# Patient Record
Sex: MALE | Race: WHITE | Employment: UNEMPLOYED | RURAL
[De-identification: names, ages, dates, MRNs, and addresses within clinical notes are randomized per-mention and may not be internally consistent; named-entity substitution may affect disease eponyms.]

---
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
## 2021-01-29 ENCOUNTER — HOSPITAL ENCOUNTER (OUTPATIENT)
Dept: RESPIRATORY THERAPY | Facility: HOSPITAL | Age: 21
Discharge: HOME | End: 2021-01-29
Payer: MEDICAID

## 2021-01-29 ENCOUNTER — HOSPITAL ENCOUNTER (OUTPATIENT)
Facility: HOSPITAL | Age: 21
Discharge: HOME | End: 2021-01-29
Payer: MEDICAID

## 2021-01-29 DIAGNOSIS — B40.9 BLASTOMYCOSIS, UNSPECIFIED: ICD-10-CM

## 2021-01-29 DIAGNOSIS — R06.02 SHORTNESS OF BREATH: ICD-10-CM

## 2021-01-29 LAB — SARS-COV-2, NAAT: NOT DETECTED

## 2021-01-29 PROCEDURE — 94010 BREATHING CAPACITY TEST: CPT

## 2021-01-29 PROCEDURE — U0002 COVID-19 LAB TEST NON-CDC: HCPCS

## 2021-03-16 ENCOUNTER — OFFICE VISIT (OUTPATIENT)
Dept: PULMONOLOGY | Facility: CLINIC | Age: 21
End: 2021-03-16

## 2021-03-16 VITALS
DIASTOLIC BLOOD PRESSURE: 90 MMHG | SYSTOLIC BLOOD PRESSURE: 130 MMHG | HEART RATE: 102 BPM | TEMPERATURE: 97.1 F | WEIGHT: 246.4 LBS | OXYGEN SATURATION: 97 %

## 2021-03-16 DIAGNOSIS — J45.40 MODERATE PERSISTENT ASTHMA WITHOUT COMPLICATION: Primary | ICD-10-CM

## 2021-03-16 DIAGNOSIS — B40.9 BLASTOMYCOSIS: ICD-10-CM

## 2021-03-16 PROCEDURE — 99204 OFFICE O/P NEW MOD 45 MIN: CPT | Performed by: INTERNAL MEDICINE

## 2021-03-16 RX ORDER — ALBUTEROL SULFATE 90 UG/1
2 AEROSOL, METERED RESPIRATORY (INHALATION) EVERY 4 HOURS PRN
COMMUNITY
End: 2021-09-09 | Stop reason: SDUPTHER

## 2021-03-16 RX ORDER — MONTELUKAST SODIUM 10 MG/1
10 TABLET ORAL NIGHTLY
Qty: 90 TABLET | Refills: 3 | Status: SHIPPED | OUTPATIENT
Start: 2021-03-16 | End: 2021-09-09 | Stop reason: SDUPTHER

## 2021-03-16 RX ORDER — ALBUTEROL SULFATE 2.5 MG/3ML
SOLUTION RESPIRATORY (INHALATION)
COMMUNITY
Start: 2021-03-11 | End: 2021-09-09

## 2021-03-16 RX ORDER — FLUTICASONE PROPIONATE 50 MCG
2 SPRAY, SUSPENSION (ML) NASAL DAILY
Qty: 16 G | Refills: 11 | Status: SHIPPED | OUTPATIENT
Start: 2021-03-16 | End: 2023-03-23 | Stop reason: SDUPTHER

## 2021-03-16 RX ORDER — TIOTROPIUM BROMIDE 18 UG/1
CAPSULE ORAL; RESPIRATORY (INHALATION)
COMMUNITY
Start: 2021-02-24 | End: 2021-03-16

## 2021-03-16 NOTE — PROGRESS NOTES
New Patient Pulmonary Office Visit      Patient Name: Humberto Noguera    Referring Physician: Suzie Ross APRN    Chief Complaint:    Chief Complaint   Patient presents with   • Cough   • Shortness of Breath   • Wheezing       History of Present Illness: Humberto Noguera is a 21 y.o. male who is here today to establish care with Pulmonary.  Patient has a past medical history significant for blastomycosis.  He presents to pulmonary for evaluation of shortness of breath.  Patient was diagnosed with acute blastomycosis back in 2017.  This was treated at the [facility] with itraconazole.  He ultimately made improvements from a vascular standpoint but after that point he always has had shortness of breath and cough.  He notes that he coughs almost on a daily basis.  He is unaware has any allergies and denies any reflux.  Notes that he has nighttime cough and awakening coughing throughout the day.  He is always short of breath.  He notes that he wheezes on a daily basis.  He states that things that helped him in the past are mainly albuterol and Symbicort.  But the insurance would not pay for Symbicort so he stopped taking it.  He tried having Spiriva but this is providing him no benefit.  Currently he is using the albuterol over 6 times per day due to the fact that he feels that he is short of breath all the time.  He denies any fever, chills, nausea, or vomiting.    Review of Systems:   Review of Systems   Constitutional: Negative for activity change, appetite change, chills and diaphoresis.   HENT: Negative for congestion, postnasal drip, sinus pressure and voice change.    Eyes: Negative for blurred vision.   Respiratory: Positive for cough, chest tightness, shortness of breath and wheezing.    Cardiovascular: Negative for chest pain.   Gastrointestinal: Negative for abdominal pain.   Musculoskeletal: Negative for myalgias.   Skin: Negative for color change and dry skin.   Allergic/Immunologic: Negative for  environmental allergies.   Neurological: Negative for weakness and confusion.   Hematological: Negative for adenopathy.   Psychiatric/Behavioral: Negative for sleep disturbance and depressed mood.       Past Medical History: History reviewed. No pertinent past medical history.    Past Surgical History: History reviewed. No pertinent surgical history.    Family History: History reviewed. No pertinent family history.    Social History:   Social History     Socioeconomic History   • Marital status: Single     Spouse name: Not on file   • Number of children: Not on file   • Years of education: Not on file   • Highest education level: Not on file   Tobacco Use   • Smoking status: Never Smoker   • Smokeless tobacco: Never Used   Substance and Sexual Activity   • Alcohol use: Never   • Drug use: Never   • Sexual activity: Defer       Medications:     Current Outpatient Medications:   •  albuterol (PROVENTIL) (2.5 MG/3ML) 0.083% nebulizer solution, USE THREE MILLILITERS I NEBULIZER EVERY 4-6 HOURS FOR COUGH AND WHEEZING AS NEEDED, Disp: , Rfl:   •  albuterol sulfate  (90 Base) MCG/ACT inhaler, Inhale 2 puffs Every 4 (Four) Hours As Needed for Wheezing., Disp: , Rfl:   •  fluticasone (Flonase) 50 MCG/ACT nasal spray, 2 sprays into the nostril(s) as directed by provider Daily., Disp: 16 g, Rfl: 11  •  Fluticasone Furoate-Vilanterol (Breo Ellipta) 100-25 MCG/INH inhaler, Inhale 1 puff Daily., Disp: 60 each, Rfl: 5  •  montelukast (SINGULAIR) 10 MG tablet, Take 1 tablet by mouth Every Night., Disp: 90 tablet, Rfl: 3    Allergies:   No Known Allergies    Physical Exam:  Vital Signs:   Vitals:    03/16/21 1207   BP: 130/90   Pulse: 102   Temp: 97.1 °F (36.2 °C)   SpO2: 97%  Comment: resting, room air   Weight: 112 kg (246 lb 6.4 oz)       Physical Exam  Vitals and nursing note reviewed.   Constitutional:       General: He is not in acute distress.     Appearance: He is well-developed and normal weight. He is not  ill-appearing or toxic-appearing.   Cardiovascular:      Rate and Rhythm: Normal rate and regular rhythm.      Pulses: Normal pulses.      Heart sounds: Normal heart sounds. No murmur. No gallop.    Pulmonary:      Effort: Pulmonary effort is normal.      Breath sounds: Wheezing present. No rhonchi or rales.   Musculoskeletal:      Right lower leg: No edema.      Left lower leg: No edema.   Neurological:      Mental Status: He is alert.         Immunization History   Administered Date(s) Administered   • DTaP 2001   • DTaP / IPV 2001   • DTaP, Unspecified 2001, 04/10/2002, 08/12/2005   • Hep B / HiB 2001, 02/26/2002   • Hep B, Unspecified 2001   • HiB 2001   • Hib (PRP-T) 2001, 05/21/2005   • IPV 04/10/2002, 04/12/2005   • MMR 04/10/2002, 04/12/2005   • Pneumococcal, Unspecified 2001   • Polio, Unspecified 2001   • Tdap 04/13/2012   • Varicella 02/26/2002, 04/13/2012       Results Review:   - I personally reviewed the pts imaging from chest x-ray shows no acute cardiopulmonary process  -I personally viewed the patient's spirometry from 2021 which showed no obstruction, normal pulmonary function test.  - I personally reviewed the pts chart with regards to evaluation by his PCP.    Assessment / Plan:   1. Moderate persistent asthma without complication (Primary)  2. Blastomycosis  -Given the patient's symptoms I do think he very likely has underlying asthma that was triggered probably initially from the blastomycosis infection. I am not sure that he will have the symptoms forever but I think for now continue to work on treating him more appropriately.  He has had some issues with paradoxical bronchospasm after some inhalers in the past, but has never taken anything on a consistent basis.  Given the fact that he has benefit from the albuterol and Symbicort.  I think this is very likely that he has underlying asthma I do not think further testing is warranted as  symptomatically he fits the criteria.  Given this I am going to start him on Breo 100 mcg 1 puff once daily I gave him samples and did teaching with the inhaler in the office prior to them leaving.  In addition to that I have sent a prescription to the pharmacy.  I explained to him that if the insurance is not covering the Breo to please let us know and we will be happy to switch him to any other combination LABA/ICS inhaler that they will cover.  In addition to this I am going to start him on Singulair 10 mg nightly.  I have also asked him to obtain a bottle of Flonase 2 puffs per nostril nightly as if there is any allergic component of postnasal drip component to his cough I would like this to be treated in addition to the underlying asthmatic component.  They verbalized understanding of this and agreed with the plan.  I gave him 6 weeks of samples of Breo, and I will have him back in 6 weeks we can continue to figure out what inhalers are going to be covered.    Follow Up:   Return in about 6 weeks (around 4/27/2021).     BRIAN Vasquez, DO  Pulmonary and Critical Care Medicine  Note Electronically Signed    Please note that portions of this note may have been completed with a voice recognition program. Efforts were made to edit the dictations, but occasionally words are mistranscribed.

## 2021-03-17 ENCOUNTER — DOCUMENTATION (OUTPATIENT)
Dept: PULMONOLOGY | Facility: CLINIC | Age: 21
End: 2021-03-17

## 2021-05-03 ENCOUNTER — OFFICE VISIT (OUTPATIENT)
Dept: PULMONOLOGY | Facility: CLINIC | Age: 21
End: 2021-05-03

## 2021-05-03 VITALS
OXYGEN SATURATION: 97 % | DIASTOLIC BLOOD PRESSURE: 60 MMHG | SYSTOLIC BLOOD PRESSURE: 128 MMHG | HEIGHT: 70 IN | TEMPERATURE: 98.2 F | HEART RATE: 107 BPM | WEIGHT: 248 LBS | BODY MASS INDEX: 35.5 KG/M2

## 2021-05-03 DIAGNOSIS — J45.40 MODERATE PERSISTENT ASTHMA WITHOUT COMPLICATION: Primary | ICD-10-CM

## 2021-05-03 DIAGNOSIS — J30.9 ALLERGIC RHINITIS, UNSPECIFIED SEASONALITY, UNSPECIFIED TRIGGER: ICD-10-CM

## 2021-05-03 PROCEDURE — 99213 OFFICE O/P EST LOW 20 MIN: CPT | Performed by: INTERNAL MEDICINE

## 2021-05-03 NOTE — PROGRESS NOTES
"Follow Up Office Note       Patient Name: Humberto Noguera    Referring Physician: No ref. provider found    Chief Complaint:    Chief Complaint   Patient presents with   • Moderate persistent asthma witout complication     f/u       History of Present Illness: Humberto Noguera is a 21 y.o. male who is here today to follow-up care with Pulmonary.  Patient has a past medical history significant for blastomycosis and asthma here today for follow-up.  Patient notes that he was doing very well when he was using the Breo 100 mcg 1 puff once daily.  It was the best he had felt in many years.  Denied any significant cough, fever, or chills.  Now been off of them for the last couple weeks and shortness of breath is began to worsen again.  He continues to use his albuterol on a daily basis.  He has no other complaints.    Review of Systems:   Review of Systems   Constitutional: Negative for chills, fatigue and fever.   HENT: Negative for congestion and voice change.    Eyes: Negative for blurred vision.   Respiratory: Negative for cough, shortness of breath and wheezing.    Cardiovascular: Negative for chest pain.   Skin: Negative for dry skin.   Hematological: Negative for adenopathy.   Psychiatric/Behavioral: Negative for agitation and depressed mood.       The following portions of the patient's history were reviewed and updated as appropriate: allergies, current medications, past family history, past medical history, past social history, past surgical history and problem list.    Physical Exam:  Vital Signs:   Vitals:    05/03/21 1537   BP: 128/60   BP Location: Left arm   Patient Position: Sitting   Cuff Size: Adult   Pulse: 107   Temp: 98.2 °F (36.8 °C)   TempSrc: Temporal   SpO2: 97%  Comment: room air, resting   Weight: 112 kg (248 lb)   Height: 177.8 cm (70\")       Physical Exam  Vitals and nursing note reviewed.   Constitutional:       General: He is not in acute distress.     Appearance: He is well-developed and normal " weight. He is not ill-appearing or toxic-appearing.   Cardiovascular:      Rate and Rhythm: Normal rate and regular rhythm.      Pulses: Normal pulses.      Heart sounds: Normal heart sounds. No murmur heard.   No gallop.    Pulmonary:      Effort: Pulmonary effort is normal.      Breath sounds: Normal breath sounds. No wheezing, rhonchi or rales.   Musculoskeletal:      Right lower leg: No edema.      Left lower leg: No edema.   Neurological:      Mental Status: He is alert.         Immunization History   Administered Date(s) Administered   • DTaP 2001   • DTaP / IPV 2001   • DTaP, Unspecified 2001, 04/10/2002, 08/12/2005   • Hep B / HiB 2001, 02/26/2002   • Hep B, Unspecified 2001   • HiB 2001   • Hib (PRP-T) 2001, 05/21/2005   • IPV 04/10/2002, 04/12/2005   • MMR 04/10/2002, 04/12/2005   • Pneumococcal, Unspecified 2001   • Polio, Unspecified 2001   • Tdap 04/13/2012   • Varicella 02/26/2002, 04/13/2012       Results Review:   - imaging from chest x-ray shows no acute cardiopulmonary process  - spirometry from 2021 which showed no obstruction, normal pulmonary function test.    Assessment / Plan:   1. Moderate persistent asthma without complication (Primary)  -Would like start the patient on Breo 1 puff once daily 100 mcg.  I sent a new prescription to the pharmacy today.  I informed him that if this was not covered please call us and we would be happy switch him over to Symbicort, Advair, or Dulera any of which the insurance would cover.  He verbalized understanding of this.    2. Allergic rhinitis, unspecified seasonality, unspecified trigger  -Recommend to go ahead and continue his Singulair and Flonase.      Follow Up:   Return in about 3 months (around 8/3/2021).       BRIAN Vasquez, DO  Pulmonary and Critical Care Medicine  Note Electronically Signed    Please note that portions of this note may have been completed with a voice recognition program.  Efforts were made to edit the dictations, but occasionally words are mistranscribed.

## 2021-09-09 ENCOUNTER — OFFICE VISIT (OUTPATIENT)
Dept: PULMONOLOGY | Facility: CLINIC | Age: 21
End: 2021-09-09

## 2021-09-09 VITALS
SYSTOLIC BLOOD PRESSURE: 150 MMHG | TEMPERATURE: 97.1 F | HEIGHT: 70 IN | WEIGHT: 260 LBS | BODY MASS INDEX: 37.22 KG/M2 | HEART RATE: 125 BPM | OXYGEN SATURATION: 97 % | DIASTOLIC BLOOD PRESSURE: 80 MMHG

## 2021-09-09 DIAGNOSIS — J45.40 MODERATE PERSISTENT ASTHMA WITHOUT COMPLICATION: Primary | ICD-10-CM

## 2021-09-09 DIAGNOSIS — J30.9 ALLERGIC RHINITIS, UNSPECIFIED SEASONALITY, UNSPECIFIED TRIGGER: ICD-10-CM

## 2021-09-09 PROCEDURE — 99214 OFFICE O/P EST MOD 30 MIN: CPT | Performed by: INTERNAL MEDICINE

## 2021-09-09 RX ORDER — ALBUTEROL SULFATE 90 UG/1
2 POWDER, METERED RESPIRATORY (INHALATION) EVERY 4 HOURS PRN
Qty: 1 EACH | Refills: 11 | Status: SHIPPED | OUTPATIENT
Start: 2021-09-09 | End: 2023-03-23 | Stop reason: SDUPTHER

## 2021-09-09 RX ORDER — MONTELUKAST SODIUM 10 MG/1
10 TABLET ORAL NIGHTLY
Qty: 90 TABLET | Refills: 3 | Status: SHIPPED | OUTPATIENT
Start: 2021-09-09 | End: 2022-10-31 | Stop reason: SDUPTHER

## 2021-09-09 NOTE — PROGRESS NOTES
"Follow Up Office Note       Patient Name: Humberto Noguera    Referring Physician: No ref. provider found    Chief Complaint:    Chief Complaint   Patient presents with   • Asthma       History of Present Illness: Humberto Noguera is a 21 y.o. male who is here today to follow-up care with Pulmonary.  Patient has a past medical history significant for blastomycosis and asthma here today for follow-up.   Regards to his asthma.  He is currently on Breo 100 mcg 1 puff once daily.  For his allergic rhinitis we have continued him on Singulair and Flonase.  Patient states that his current doing very well.  No exacerbations since her last visit.  Denies any fever, chills, nausea, or vomiting.  He does cough occasionally but is not causing any significant amount of problems.  He has no other complaints.    Review of Systems:   Review of Systems   Constitutional: Negative for chills, fatigue and fever.   HENT: Negative for congestion and voice change.    Eyes: Negative for blurred vision.   Respiratory: Negative for cough, shortness of breath and wheezing.    Cardiovascular: Negative for chest pain.   Skin: Negative for dry skin.   Hematological: Negative for adenopathy.   Psychiatric/Behavioral: Negative for agitation and depressed mood.       The following portions of the patient's history were reviewed and updated as appropriate: allergies, current medications, past family history, past medical history, past social history, past surgical history and problem list.    Physical Exam:  Vital Signs:   Vitals:    09/09/21 1547   BP: 150/80   Pulse: (!) 125   Temp: 97.1 °F (36.2 °C)   SpO2: 97%  Comment: resting at rom air   Weight: 118 kg (260 lb)   Height: 177.8 cm (70\")       Physical Exam  Vitals and nursing note reviewed.   Constitutional:       General: He is not in acute distress.     Appearance: He is well-developed and normal weight. He is not ill-appearing or toxic-appearing.   Cardiovascular:      Rate and Rhythm: Normal rate " and regular rhythm.      Pulses: Normal pulses.      Heart sounds: Normal heart sounds. No murmur heard.   No gallop.    Pulmonary:      Effort: Pulmonary effort is normal.      Breath sounds: Normal breath sounds. No wheezing, rhonchi or rales.   Musculoskeletal:      Right lower leg: No edema.      Left lower leg: No edema.   Neurological:      Mental Status: He is alert.         Immunization History   Administered Date(s) Administered   • DTaP 2001   • DTaP / IPV 2001   • DTaP, Unspecified 2001, 04/10/2002, 08/12/2005   • Hep B / HiB 2001, 02/26/2002   • Hep B, Unspecified 2001   • HiB 2001   • Hib (PRP-T) 2001, 05/21/2005   • IPV 04/10/2002, 04/12/2005   • MMR 04/10/2002, 04/12/2005   • Pneumococcal, Unspecified 2001   • Polio, Unspecified 2001   • Tdap 04/13/2012   • Varicella 02/26/2002, 04/13/2012       Results Review:   - imaging from chest x-ray shows no acute cardiopulmonary process  - spirometry from 2021 which showed no obstruction, normal pulmonary function test.    Assessment / Plan:   1. Moderate persistent asthma without complication (Primary)  -Asthma is currently in good control.  I will have her continue on the Breo 100 mcg 1 puff once daily, and I want her to continue on the albuterol as well.  I have sent refills for each 1 of these prescriptions to the pharmacy today.  He should continue to get diet and exercise.  I did inform him that if he starts having increased coughing congestion or signs of asthma exacerbation please call office and we can likely deal with that either over the phone will have him come for a visit prior to his follow-up in 1 year.  Also informed him that we have a significant increase that we can provide him from an asthma standpoint if he were to start to worsen at some point time.    2. Allergic rhinitis, unspecified seasonality, unspecified trigger  -With regards allergic rhinitis is currently fairly well controlled  he should continue on the Flonase and Singulair.      Follow Up:   Return in about 1 year (around 9/9/2022).       BRIAN Vasquez, DO  Pulmonary and Critical Care Medicine  Note Electronically Signed    Please note that portions of this note may have been completed with a voice recognition program. Efforts were made to edit the dictations, but occasionally words are mistranscribed.

## 2021-09-17 ENCOUNTER — HOSPITAL ENCOUNTER (OUTPATIENT)
Facility: HOSPITAL | Age: 21
Discharge: HOME | End: 2021-09-17
Payer: MEDICAID

## 2021-09-17 LAB
A/G RATIO: 1.6 (ref 0.8–2)
ALBUMIN SERPL-MCNC: 4.8 G/DL (ref 3.4–4.8)
ALP BLD-CCNC: 91 U/L (ref 25–100)
ALT SERPL-CCNC: 54 U/L (ref 4–36)
ANION GAP SERPL CALCULATED.3IONS-SCNC: 12 MMOL/L (ref 3–16)
AST SERPL-CCNC: 27 U/L (ref 8–33)
BASOPHILS ABSOLUTE: 0.1 K/UL (ref 0–0.1)
BASOPHILS RELATIVE PERCENT: 1.2 %
BILIRUB SERPL-MCNC: 0.4 MG/DL (ref 0.3–1.2)
BUN BLDV-MCNC: 9 MG/DL (ref 6–20)
CALCIUM SERPL-MCNC: 9.8 MG/DL (ref 8.5–10.5)
CHLORIDE BLD-SCNC: 101 MMOL/L (ref 98–107)
CHOLESTEROL, TOTAL: 196 MG/DL (ref 0–200)
CO2: 24 MMOL/L (ref 20–30)
CREAT SERPL-MCNC: 0.9 MG/DL (ref 0.4–1.2)
EOSINOPHILS ABSOLUTE: 0.3 K/UL (ref 0–0.4)
EOSINOPHILS RELATIVE PERCENT: 3.9 %
FOLATE: 12.63 NG/ML
GFR AFRICAN AMERICAN: >59
GFR NON-AFRICAN AMERICAN: >59
GLOBULIN: 3 G/DL
GLUCOSE BLD-MCNC: 81 MG/DL (ref 74–106)
HBA1C MFR BLD: 4.9 %
HCT VFR BLD CALC: 45.1 % (ref 40–54)
HDLC SERPL-MCNC: 41 MG/DL (ref 40–60)
HEMOGLOBIN: 15.5 G/DL (ref 13–18)
IMMATURE GRANULOCYTES #: 0 K/UL
IMMATURE GRANULOCYTES %: 0.4 % (ref 0–5)
LDL CHOLESTEROL CALCULATED: 138 MG/DL
LYMPHOCYTES ABSOLUTE: 2.2 K/UL (ref 1.5–4)
LYMPHOCYTES RELATIVE PERCENT: 26.9 %
MCH RBC QN AUTO: 30.6 PG (ref 27–32)
MCHC RBC AUTO-ENTMCNC: 34.4 G/DL (ref 31–35)
MCV RBC AUTO: 89.1 FL (ref 80–100)
MONOCYTES ABSOLUTE: 0.6 K/UL (ref 0.2–0.8)
MONOCYTES RELATIVE PERCENT: 7.7 %
NEUTROPHILS ABSOLUTE: 5 K/UL (ref 2–7.5)
NEUTROPHILS RELATIVE PERCENT: 59.9 %
PDW BLD-RTO: 12.5 % (ref 11–16)
PLATELET # BLD: 299 K/UL (ref 150–400)
PMV BLD AUTO: 11.2 FL (ref 6–10)
POTASSIUM SERPL-SCNC: 4.3 MMOL/L (ref 3.4–5.1)
RBC # BLD: 5.06 M/UL (ref 4.5–6)
SODIUM BLD-SCNC: 137 MMOL/L (ref 136–145)
TOTAL PROTEIN: 7.8 G/DL (ref 6.4–8.3)
TRIGL SERPL-MCNC: 87 MG/DL (ref 0–249)
TSH SERPL DL<=0.05 MIU/L-ACNC: 1.63 UIU/ML (ref 0.27–4.2)
VITAMIN B-12: 436 PG/ML (ref 211–911)
VLDLC SERPL CALC-MCNC: 17 MG/DL
WBC # BLD: 8.3 K/UL (ref 4–11)

## 2021-09-17 PROCEDURE — 82746 ASSAY OF FOLIC ACID SERUM: CPT

## 2021-09-17 PROCEDURE — 80053 COMPREHEN METABOLIC PANEL: CPT

## 2021-09-17 PROCEDURE — 82607 VITAMIN B-12: CPT

## 2021-09-17 PROCEDURE — 36415 COLL VENOUS BLD VENIPUNCTURE: CPT

## 2021-09-17 PROCEDURE — 85025 COMPLETE CBC W/AUTO DIFF WBC: CPT

## 2021-09-17 PROCEDURE — 80061 LIPID PANEL: CPT

## 2021-09-17 PROCEDURE — 83036 HEMOGLOBIN GLYCOSYLATED A1C: CPT

## 2021-09-17 PROCEDURE — 84443 ASSAY THYROID STIM HORMONE: CPT

## 2022-09-16 DIAGNOSIS — J30.9 ALLERGIC RHINITIS, UNSPECIFIED SEASONALITY, UNSPECIFIED TRIGGER: ICD-10-CM

## 2022-09-16 DIAGNOSIS — J45.40 MODERATE PERSISTENT ASTHMA WITHOUT COMPLICATION: ICD-10-CM

## 2022-09-16 RX ORDER — MONTELUKAST SODIUM 10 MG/1
10 TABLET ORAL NIGHTLY
Qty: 90 TABLET | Refills: 3 | OUTPATIENT
Start: 2022-09-16

## 2022-09-17 DIAGNOSIS — J45.40 MODERATE PERSISTENT ASTHMA WITHOUT COMPLICATION: ICD-10-CM

## 2022-10-31 DIAGNOSIS — J30.9 ALLERGIC RHINITIS, UNSPECIFIED SEASONALITY, UNSPECIFIED TRIGGER: ICD-10-CM

## 2022-10-31 DIAGNOSIS — J45.40 MODERATE PERSISTENT ASTHMA WITHOUT COMPLICATION: ICD-10-CM

## 2022-10-31 RX ORDER — FLUTICASONE FUROATE AND VILANTEROL 100; 25 UG/1; UG/1
1 POWDER RESPIRATORY (INHALATION)
Qty: 60 EACH | Refills: 0 | Status: SHIPPED | OUTPATIENT
Start: 2022-10-31 | End: 2023-01-20 | Stop reason: SDUPTHER

## 2022-10-31 RX ORDER — MONTELUKAST SODIUM 10 MG/1
10 TABLET ORAL NIGHTLY
Qty: 90 TABLET | Refills: 0 | Status: SHIPPED | OUTPATIENT
Start: 2022-10-31 | End: 2023-01-20 | Stop reason: SDUPTHER

## 2022-12-09 DIAGNOSIS — J45.40 MODERATE PERSISTENT ASTHMA WITHOUT COMPLICATION: ICD-10-CM

## 2022-12-09 RX ORDER — FLUTICASONE FUROATE AND VILANTEROL 100; 25 UG/1; UG/1
POWDER RESPIRATORY (INHALATION)
Qty: 60 EACH | Refills: 0 | OUTPATIENT
Start: 2022-12-09

## 2023-01-20 DIAGNOSIS — J45.40 MODERATE PERSISTENT ASTHMA WITHOUT COMPLICATION: ICD-10-CM

## 2023-01-20 DIAGNOSIS — J30.9 ALLERGIC RHINITIS, UNSPECIFIED SEASONALITY, UNSPECIFIED TRIGGER: ICD-10-CM

## 2023-01-20 RX ORDER — MONTELUKAST SODIUM 10 MG/1
10 TABLET ORAL NIGHTLY
Qty: 90 TABLET | Refills: 1 | Status: SHIPPED | OUTPATIENT
Start: 2023-01-20 | End: 2023-03-23 | Stop reason: SDUPTHER

## 2023-01-20 RX ORDER — FLUTICASONE FUROATE AND VILANTEROL 100; 25 UG/1; UG/1
1 POWDER RESPIRATORY (INHALATION)
Qty: 60 EACH | Refills: 3 | Status: SHIPPED | OUTPATIENT
Start: 2023-01-20 | End: 2023-03-23 | Stop reason: SDUPTHER

## 2023-03-23 ENCOUNTER — OFFICE VISIT (OUTPATIENT)
Dept: PULMONOLOGY | Facility: CLINIC | Age: 23
End: 2023-03-23
Payer: COMMERCIAL

## 2023-03-23 VITALS
DIASTOLIC BLOOD PRESSURE: 84 MMHG | WEIGHT: 262 LBS | HEART RATE: 107 BPM | TEMPERATURE: 98.9 F | HEIGHT: 71 IN | SYSTOLIC BLOOD PRESSURE: 124 MMHG | BODY MASS INDEX: 36.68 KG/M2 | OXYGEN SATURATION: 97 %

## 2023-03-23 DIAGNOSIS — J45.40 MODERATE PERSISTENT ASTHMA WITHOUT COMPLICATION: Primary | ICD-10-CM

## 2023-03-23 DIAGNOSIS — J30.9 ALLERGIC RHINITIS, UNSPECIFIED SEASONALITY, UNSPECIFIED TRIGGER: ICD-10-CM

## 2023-03-23 PROCEDURE — 94729 DIFFUSING CAPACITY: CPT | Performed by: INTERNAL MEDICINE

## 2023-03-23 PROCEDURE — 94010 BREATHING CAPACITY TEST: CPT | Performed by: INTERNAL MEDICINE

## 2023-03-23 PROCEDURE — 94726 PLETHYSMOGRAPHY LUNG VOLUMES: CPT | Performed by: INTERNAL MEDICINE

## 2023-03-23 PROCEDURE — 99213 OFFICE O/P EST LOW 20 MIN: CPT | Performed by: INTERNAL MEDICINE

## 2023-03-23 PROCEDURE — 1159F MED LIST DOCD IN RCRD: CPT | Performed by: INTERNAL MEDICINE

## 2023-03-23 PROCEDURE — 1160F RVW MEDS BY RX/DR IN RCRD: CPT | Performed by: INTERNAL MEDICINE

## 2023-03-23 RX ORDER — ALBUTEROL SULFATE 90 UG/1
2 POWDER, METERED RESPIRATORY (INHALATION) EVERY 4 HOURS PRN
Qty: 1 EACH | Refills: 11 | Status: SHIPPED | OUTPATIENT
Start: 2023-03-23

## 2023-03-23 RX ORDER — MONTELUKAST SODIUM 10 MG/1
10 TABLET ORAL NIGHTLY
Qty: 90 TABLET | Refills: 3 | Status: SHIPPED | OUTPATIENT
Start: 2023-03-23

## 2023-03-23 RX ORDER — FLUTICASONE PROPIONATE 50 MCG
2 SPRAY, SUSPENSION (ML) NASAL DAILY
Qty: 16 G | Refills: 11 | Status: SHIPPED | OUTPATIENT
Start: 2023-03-23

## 2023-03-23 RX ORDER — FLUTICASONE FUROATE AND VILANTEROL 100; 25 UG/1; UG/1
1 POWDER RESPIRATORY (INHALATION)
Qty: 60 EACH | Refills: 11 | Status: SHIPPED | OUTPATIENT
Start: 2023-03-23

## 2023-03-23 NOTE — PROGRESS NOTES
"Follow Up Office Note       Patient Name: Humberto Noguera    Referring Physician: No ref. provider found    Chief Complaint:    Chief Complaint   Patient presents with   • Asthma     F/u       History of Present Illness: Humberto Noguera is a 23 y.o. male who is here today to follow-up care with Pulmonary.    Patient has a past medical history significant for blastomycosis and asthma here today for follow-up.    Asthma overall well controlled lessers out of inhalers.  No acute complaints.  No breathing issues at this time.    Review of Systems:   Review of Systems   Constitutional: Negative for chills, fatigue and fever.   HENT: Negative for congestion and voice change.    Eyes: Negative for blurred vision.   Respiratory: Negative for cough, shortness of breath and wheezing.    Cardiovascular: Negative for chest pain.   Skin: Negative for dry skin.   Hematological: Negative for adenopathy.   Psychiatric/Behavioral: Negative for agitation and depressed mood.       The following portions of the patient's history were reviewed and updated as appropriate: allergies, current medications, past family history, past medical history, past social history, past surgical history and problem list.    Physical Exam:  Vital Signs:   Vitals:    03/23/23 1530   BP: 124/84   BP Location: Right arm   Patient Position: Sitting   Cuff Size: Adult   Pulse: 107   Temp: 98.9 °F (37.2 °C)   TempSrc: Infrared   SpO2: 97%  Comment: resting room air   Weight: 119 kg (262 lb)   Height: 180.3 cm (71\")       Physical Exam  Vitals and nursing note reviewed.   Constitutional:       General: He is not in acute distress.     Appearance: He is well-developed and normal weight. He is not ill-appearing or toxic-appearing.   Cardiovascular:      Rate and Rhythm: Normal rate and regular rhythm.      Pulses: Normal pulses.      Heart sounds: Normal heart sounds. No murmur heard.    No gallop.   Pulmonary:      Effort: Pulmonary effort is normal.      Breath sounds: " Normal breath sounds. No wheezing, rhonchi or rales.   Musculoskeletal:      Right lower leg: No edema.      Left lower leg: No edema.   Neurological:      Mental Status: He is alert.         Immunization History   Administered Date(s) Administered   • DTaP 2001, 04/13/2012   • DTaP / HiB / IPV 2001   • DTaP / IPV 2001, 04/10/2002, 04/12/2005   • DTaP, Unspecified 2001, 04/10/2002, 08/12/2005   • FluLaval/Fluzone >6mos 01/18/2018   • Hep B / HiB 2001, 02/26/2002   • Hep B, Unspecified 2001, 2001, 02/26/2002   • HiB 2001   • Hib (PRP-T) 2001, 02/26/2002, 05/21/2005   • IPV 04/10/2002, 04/12/2005   • MMR 04/10/2002, 04/12/2005   • Pneumococcal, Unspecified 2001, 2001, 2001   • Polio, Unspecified 2001   • Tdap 04/13/2012   • Varicella 02/26/2002, 04/13/2012       Results Review:   -I personally viewed the patient's chest x-ray from 3/23/2023 which shows no acute cardiopulmonary process.  -I personally viewed the patient's pulmonary function testing from 3/23/2023 which showed no obstruction or restriction with a normal DLCO.   - spirometry from 2021 which showed no obstruction, normal pulmonary function test.    Assessment / Plan:   Diagnoses and all orders for this visit:    1. Moderate persistent asthma without complication (Primary)  -Symptoms well controlled continue Breo 100 mcg 1 puff once daily addition to albuterol on as-needed basis.    2. Allergic rhinitis, unspecified seasonality, unspecified trigger  -Symptoms overall well controlled, continue Flonase and Singulair      Follow Up:   Return in about 1 year (around 3/23/2024).       BRIAN Vasquez,   Pulmonary and Critical Care Medicine  Note Electronically Signed    Part of this note may be an electronic transcription/translation of spoken language to printed text using the Dragon Dictation System.

## 2023-06-06 DIAGNOSIS — J45.40 MODERATE PERSISTENT ASTHMA WITHOUT COMPLICATION: ICD-10-CM

## 2023-06-06 RX ORDER — FLUTICASONE FUROATE AND VILANTEROL 100; 25 UG/1; UG/1
POWDER RESPIRATORY (INHALATION)
Qty: 60 EACH | Refills: 11 | Status: SHIPPED | OUTPATIENT
Start: 2023-06-06

## 2024-02-01 DIAGNOSIS — J45.40 MODERATE PERSISTENT ASTHMA WITHOUT COMPLICATION: ICD-10-CM

## 2024-02-01 RX ORDER — FLUTICASONE FUROATE AND VILANTEROL 100; 25 UG/1; UG/1
POWDER RESPIRATORY (INHALATION)
Qty: 60 EACH | Refills: 1 | Status: SHIPPED | OUTPATIENT
Start: 2024-02-01

## 2024-02-01 RX ORDER — FLUTICASONE FUROATE AND VILANTEROL 100; 25 UG/1; UG/1
POWDER RESPIRATORY (INHALATION)
Qty: 180 EACH | OUTPATIENT
Start: 2024-02-01

## 2024-04-19 DIAGNOSIS — J45.40 MODERATE PERSISTENT ASTHMA WITHOUT COMPLICATION: ICD-10-CM

## 2024-04-19 RX ORDER — FLUTICASONE FUROATE AND VILANTEROL 100; 25 UG/1; UG/1
POWDER RESPIRATORY (INHALATION)
Qty: 180 EACH | OUTPATIENT
Start: 2024-04-19

## 2024-04-19 RX ORDER — FLUTICASONE FUROATE AND VILANTEROL 100; 25 UG/1; UG/1
POWDER RESPIRATORY (INHALATION)
Qty: 28 EACH | Refills: 0 | Status: SHIPPED | OUTPATIENT
Start: 2024-04-19

## 2024-07-12 ENCOUNTER — OFFICE VISIT (OUTPATIENT)
Dept: PULMONOLOGY | Facility: CLINIC | Age: 24
End: 2024-07-12
Payer: COMMERCIAL

## 2024-07-12 VITALS
WEIGHT: 271 LBS | OXYGEN SATURATION: 96 % | SYSTOLIC BLOOD PRESSURE: 138 MMHG | HEART RATE: 88 BPM | TEMPERATURE: 99.5 F | BODY MASS INDEX: 37.94 KG/M2 | HEIGHT: 71 IN | DIASTOLIC BLOOD PRESSURE: 70 MMHG

## 2024-07-12 DIAGNOSIS — J30.9 ALLERGIC RHINITIS, UNSPECIFIED SEASONALITY, UNSPECIFIED TRIGGER: ICD-10-CM

## 2024-07-12 DIAGNOSIS — J45.40 MODERATE PERSISTENT ASTHMA WITHOUT COMPLICATION: Primary | ICD-10-CM

## 2024-07-12 PROCEDURE — 99214 OFFICE O/P EST MOD 30 MIN: CPT | Performed by: NURSE PRACTITIONER

## 2024-07-12 RX ORDER — ALBUTEROL SULFATE 90 UG/1
2 POWDER, METERED RESPIRATORY (INHALATION) EVERY 4 HOURS PRN
Qty: 1 EACH | Refills: 11 | Status: SHIPPED | OUTPATIENT
Start: 2024-07-12

## 2024-07-12 RX ORDER — FLUTICASONE FUROATE AND VILANTEROL 100; 25 UG/1; UG/1
1 POWDER RESPIRATORY (INHALATION) DAILY
Qty: 28 EACH | Refills: 0 | Status: SHIPPED | OUTPATIENT
Start: 2024-07-12

## 2024-07-12 NOTE — PROGRESS NOTES
[facility] Pulmonary Follow up    CHIEF COMPLAINT    Moderate persistent asthma    HISTORY OF PRESENT ILLNESS    HPI:   Humberto Noguera is a 24 y.o.male followed by pulmonary regarding his moderate persistent asthma.    He is on Breo 100 1 puff daily in addition to albuterol as needed.    Does have allergic rhinitis and symptoms are well-controlled on Flonase and Singulair.    He is lifelong non-smoker. He is around a lot of second-hand smoke.     Prior labs did show an AEC of 300.    Interval history:   The patient has done well since his last evaluation by Dr. Vasquez in March 2023.    Denies recent ED visits, hospitalizations, or exacerbations.     He has been off his Breo for the past month and has noted worsening shortness of breath but no wheezing.  He does have an albuterol inhaler that he uses as needed.    Denies fever, chills, night sweats, or hemoptysis. No recent sick contacts. No chest pain or palpitations. Denies lower extremity swelling or calf tenderness.     Patient Active Problem List   Diagnosis    Moderate persistent asthma without complication    Allergic rhinitis       No Known Allergies    Current Outpatient Medications:     albuterol (ProAir RespiClick) 108 (90 Base) MCG/ACT inhaler, Inhale 2 puffs Every 4 (Four) Hours As Needed for Wheezing or Shortness of Air., Disp: 1 each, Rfl: 11    fluticasone (Flonase) 50 MCG/ACT nasal spray, 2 sprays into the nostril(s) as directed by provider Daily., Disp: 16 g, Rfl: 11    Fluticasone Furoate-Vilanterol 100-25 MCG/ACT aerosol powder , Inhale 1 puff Daily., Disp: 28 each, Rfl: 0    montelukast (SINGULAIR) 10 MG tablet, Take 1 tablet by mouth Every Night., Disp: 90 tablet, Rfl: 3  MEDICATION LIST AND ALLERGIES REVIEWED.    Social History     Tobacco Use    Smoking status: Never    Smokeless tobacco: Never   Vaping Use    Vaping status: Never Used   Substance Use Topics    Alcohol use: Never    Drug use: Never       FAMILY AND SOCIAL HISTORY  "REVIEWED.    Review of Systems   Constitutional:  Negative for chills, fatigue and fever.   Respiratory:  Positive for shortness of breath. Negative for cough, chest tightness and wheezing.    Cardiovascular:  Negative for chest pain, palpitations and leg swelling.   Skin:  Negative for color change.   Psychiatric/Behavioral:  Negative for sleep disturbance.    All other systems reviewed and are negative.  .    /70   Pulse 88   Temp 99.5 °F (37.5 °C)   Ht 180.3 cm (70.98\")   Wt 123 kg (271 lb)   SpO2 96% Comment: room air at rest  BMI 37.81 kg/m²     Immunization History   Administered Date(s) Administered    DTaP 2001, 04/13/2012    DTaP / HiB / IPV 2001    DTaP / IPV 2001, 04/10/2002, 04/12/2005    DTaP, Unspecified 2001, 04/10/2002, 08/12/2005    Fluzone (or Fluarix & Flulaval for VFC) >6mos 01/18/2018    Hep B / HiB 2001, 02/26/2002    Hep B, Unspecified 2001, 2001, 02/26/2002    HiB 2001    Hib (PRP-T) 2001, 02/26/2002, 05/21/2005    IPV 04/10/2002, 04/12/2005    MMR 04/10/2002, 04/12/2005    Pneumococcal, Unspecified 2001, 2001, 2001    Polio, Unspecified 2001    Tdap 04/13/2012    Varicella 02/26/2002, 04/13/2012       Physical Exam  Vitals reviewed.   Constitutional:       General: He is not in acute distress.     Appearance: Normal appearance.   Cardiovascular:      Rate and Rhythm: Normal rate and regular rhythm.      Pulses: Normal pulses.      Heart sounds: Normal heart sounds.   Pulmonary:      Effort: Pulmonary effort is normal. No respiratory distress.      Breath sounds: No wheezing, rhonchi or rales.   Skin:     General: Skin is warm and dry.   Neurological:      Mental Status: He is alert.         RESULTS    PFTs:  7/12/2024: No airway obstruction.  No restriction.  Normal DLCO.    3/23/2023: No evidence of airway obstruction.  No restriction.  No air trapping.  Normal DLCO.    Imaging:   CXR PA/lateral " 7/12/2024  Awaiting final MD interpretation. I will contact the patient if abnormal results.     CXR PA/lateral 3/23/2023  Interpretation:   No acute cardiopulmonary findings    PROBLEM LIST    Problem List Items Addressed This Visit       Moderate persistent asthma without complication - Primary    Relevant Medications    Fluticasone Furoate-Vilanterol 100-25 MCG/ACT aerosol powder     albuterol (ProAir RespiClick) 108 (90 Base) MCG/ACT inhaler    Other Relevant Orders    XR Chest PA & Lateral    Allergic rhinitis       DISCUSSION    Mr. Noguera was seen today for follow-up and is stable from pulmonary standpoint.    He has not had any respiratory issues since his last evaluation, however has had slightly worse shortness of breath over the past month as he ran out of his Breo inhaler.    Asthma  Asthma is currently moderately controlled  No exacerbations in the last year  Continue Breo 100 1 puff daily for medication management  Continue albuterol inhaler as needed for shortness of breath/wheezing every 4-6 hours  If his shortness of breath does not improve on his Breo 100, I will increase his dose to 200 and if symptoms are ongoing recommend future consideration of biologic agent.  He did have an elevated AEC of 300 back in 2021.  Latest PFTs as noted above  Avoid known triggers   Educated on pursed-lip breathing technique    Allergic rhinitis  Continue Singulair and Flonase as needed    Moderate level of Medical Decision Making complexity based on 2 or more chronic stable illnesses and prescription drug management.    Electronically signed by DUONG Richardson, 07/12/24, 1:14 PM EDT.    Please note that portions of this note were completed with a voice recognition program.      CC: Suzie Ross APRN

## 2024-08-18 DIAGNOSIS — J45.40 MODERATE PERSISTENT ASTHMA WITHOUT COMPLICATION: ICD-10-CM

## 2024-08-19 RX ORDER — FLUTICASONE FUROATE AND VILANTEROL 100; 25 UG/1; UG/1
1 POWDER RESPIRATORY (INHALATION) DAILY
Qty: 60 EACH | Refills: 3 | Status: SHIPPED | OUTPATIENT
Start: 2024-08-19

## 2025-03-24 DIAGNOSIS — J45.40 MODERATE PERSISTENT ASTHMA WITHOUT COMPLICATION: ICD-10-CM

## 2025-03-24 RX ORDER — FLUTICASONE FUROATE AND VILANTEROL 100; 25 UG/1; UG/1
1 POWDER RESPIRATORY (INHALATION) DAILY
Qty: 60 EACH | Refills: 3 | Status: SHIPPED | OUTPATIENT
Start: 2025-03-24

## 2025-06-11 DIAGNOSIS — J30.9 ALLERGIC RHINITIS, UNSPECIFIED SEASONALITY, UNSPECIFIED TRIGGER: ICD-10-CM

## 2025-06-11 DIAGNOSIS — J45.40 MODERATE PERSISTENT ASTHMA WITHOUT COMPLICATION: ICD-10-CM

## 2025-06-11 RX ORDER — FLUTICASONE FUROATE AND VILANTEROL 100; 25 UG/1; UG/1
1 POWDER RESPIRATORY (INHALATION) DAILY
Qty: 60 EACH | Refills: 3 | Status: SHIPPED | OUTPATIENT
Start: 2025-06-11

## 2025-06-11 RX ORDER — MONTELUKAST SODIUM 10 MG/1
10 TABLET ORAL NIGHTLY
Qty: 90 TABLET | Refills: 3 | Status: SHIPPED | OUTPATIENT
Start: 2025-06-11

## 2025-06-11 RX ORDER — ALBUTEROL SULFATE 90 UG/1
2 POWDER, METERED RESPIRATORY (INHALATION) EVERY 4 HOURS PRN
Qty: 1 EACH | Refills: 11 | Status: SHIPPED | OUTPATIENT
Start: 2025-06-11

## 2025-06-11 RX ORDER — ALBUTEROL SULFATE 0.83 MG/ML
2.5 SOLUTION RESPIRATORY (INHALATION) 4 TIMES DAILY PRN
Qty: 360 ML | Refills: 3 | Status: SHIPPED | OUTPATIENT
Start: 2025-06-11

## 2025-07-12 ENCOUNTER — APPOINTMENT (OUTPATIENT)
Dept: GENERAL RADIOLOGY | Facility: HOSPITAL | Age: 25
End: 2025-07-12
Attending: EMERGENCY MEDICINE
Payer: MEDICAID

## 2025-07-12 ENCOUNTER — HOSPITAL ENCOUNTER (EMERGENCY)
Facility: HOSPITAL | Age: 25
Discharge: HOME | End: 2025-07-12
Attending: EMERGENCY MEDICINE
Payer: MEDICAID

## 2025-07-12 VITALS
RESPIRATION RATE: 17 BRPM | OXYGEN SATURATION: 94 % | BODY MASS INDEX: 31.64 KG/M2 | WEIGHT: 226 LBS | DIASTOLIC BLOOD PRESSURE: 57 MMHG | TEMPERATURE: 98.7 F | HEIGHT: 71 IN | HEART RATE: 98 BPM | SYSTOLIC BLOOD PRESSURE: 111 MMHG

## 2025-07-12 DIAGNOSIS — E87.6 HYPOKALEMIA: ICD-10-CM

## 2025-07-12 DIAGNOSIS — J06.9 VIRAL URI WITH COUGH: Primary | ICD-10-CM

## 2025-07-12 LAB
ANION GAP SERPL CALCULATED.3IONS-SCNC: 13 MMOL/L (ref 3–16)
BASE EXCESS BLDV CALC-SCNC: 1.3 MMOL/L (ref -3–3)
BASOPHILS # BLD: 0.1 K/UL (ref 0–0.1)
BASOPHILS NFR BLD: 1.1 %
BUN SERPL-MCNC: 9 MG/DL (ref 6–20)
CALCIUM SERPL-MCNC: 9.2 MG/DL (ref 8.3–10.6)
CHLORIDE SERPL-SCNC: 105 MMOL/L (ref 98–107)
CO2 BLDV-SCNC: 27 MMOL/L
CO2 SERPL-SCNC: 23 MMOL/L (ref 20–30)
CREAT SERPL-MCNC: 0.9 MG/DL (ref 0.9–1.3)
EOSINOPHIL # BLD: 1.4 K/UL (ref 0–0.4)
EOSINOPHIL NFR BLD: 11.9 %
ERYTHROCYTE [DISTWIDTH] IN BLOOD BY AUTOMATED COUNT: 12.2 % (ref 11–16)
GFR SERPLBLD CREATININE-BSD FMLA CKD-EPI: >90 ML/MIN/{1.73_M2}
GLUCOSE SERPL-MCNC: 121 MG/DL (ref 74–106)
HCO3 BLDV-SCNC: 25.4 MMOL/L (ref 23–29)
HCT VFR BLD AUTO: 44.1 % (ref 40–54)
HGB BLD-MCNC: 15.5 G/DL (ref 13–18)
IMM GRANULOCYTES # BLD: 0 K/UL
IMM GRANULOCYTES NFR BLD: 0.3 % (ref 0–5)
INHALED O2 FLOW RATE: 0.2 %
LYMPHOCYTES # BLD: 3.6 K/UL (ref 1.5–4)
LYMPHOCYTES NFR BLD: 30.9 %
MCH RBC QN AUTO: 30.8 PG (ref 27–32)
MCHC RBC AUTO-ENTMCNC: 35.1 G/DL (ref 31–35)
MCV RBC AUTO: 87.5 FL (ref 80–100)
MONOCYTES # BLD: 1 K/UL (ref 0.2–0.8)
MONOCYTES NFR BLD: 8.8 %
NEUTROPHILS # BLD: 5.4 K/UL (ref 2–7.5)
NEUTS SEG NFR BLD: 47 %
O2 THERAPY: ABNORMAL
PCO2 BLDV: 38.7 MMHG (ref 40–50)
PH BLDV: 7.43 [PH] (ref 7.35–7.45)
PLATELET # BLD AUTO: 251 K/UL (ref 150–400)
PMV BLD AUTO: 12 FL (ref 6–10)
PO2 BLDV: 76.8 MMHG (ref 25–40)
POTASSIUM SERPL-SCNC: 2.8 MMOL/L (ref 3.4–5.1)
POTASSIUM SERPL-SCNC: 3.7 MMOL/L (ref 3.4–5.1)
RBC # BLD AUTO: 5.04 M/UL (ref 4.5–6)
SAO2 % BLDV: 96 %
SODIUM SERPL-SCNC: 141 MMOL/L (ref 136–145)
WBC # BLD AUTO: 11.5 K/UL (ref 4–11)

## 2025-07-12 PROCEDURE — 99284 EMERGENCY DEPT VISIT MOD MDM: CPT

## 2025-07-12 PROCEDURE — 2500000003 HC RX 250 WO HCPCS: Performed by: EMERGENCY MEDICINE

## 2025-07-12 PROCEDURE — 71046 X-RAY EXAM CHEST 2 VIEWS: CPT

## 2025-07-12 PROCEDURE — 94640 AIRWAY INHALATION TREATMENT: CPT

## 2025-07-12 PROCEDURE — 96375 TX/PRO/DX INJ NEW DRUG ADDON: CPT

## 2025-07-12 PROCEDURE — 6370000000 HC RX 637 (ALT 250 FOR IP): Performed by: EMERGENCY MEDICINE

## 2025-07-12 PROCEDURE — 84132 ASSAY OF SERUM POTASSIUM: CPT

## 2025-07-12 PROCEDURE — 6360000002 HC RX W HCPCS: Performed by: EMERGENCY MEDICINE

## 2025-07-12 PROCEDURE — 96365 THER/PROPH/DIAG IV INF INIT: CPT

## 2025-07-12 PROCEDURE — 36415 COLL VENOUS BLD VENIPUNCTURE: CPT

## 2025-07-12 PROCEDURE — 85025 COMPLETE CBC W/AUTO DIFF WBC: CPT

## 2025-07-12 PROCEDURE — 80048 BASIC METABOLIC PNL TOTAL CA: CPT

## 2025-07-12 PROCEDURE — 82803 BLOOD GASES ANY COMBINATION: CPT

## 2025-07-12 RX ORDER — ALBUTEROL SULFATE 90 UG/1
2 INHALANT RESPIRATORY (INHALATION) EVERY 6 HOURS PRN
Qty: 18 G | Refills: 3 | Status: SHIPPED | OUTPATIENT
Start: 2025-07-12

## 2025-07-12 RX ORDER — ONDANSETRON 2 MG/ML
4 INJECTION INTRAMUSCULAR; INTRAVENOUS ONCE
Status: COMPLETED | OUTPATIENT
Start: 2025-07-12 | End: 2025-07-12

## 2025-07-12 RX ORDER — PREDNISONE 50 MG/1
50 TABLET ORAL DAILY
Qty: 3 TABLET | Refills: 0 | Status: SHIPPED | OUTPATIENT
Start: 2025-07-12 | End: 2025-07-15

## 2025-07-12 RX ORDER — POTASSIUM CHLORIDE 7.45 MG/ML
10 INJECTION INTRAVENOUS ONCE
Status: COMPLETED | OUTPATIENT
Start: 2025-07-12 | End: 2025-07-12

## 2025-07-12 RX ORDER — IPRATROPIUM BROMIDE AND ALBUTEROL SULFATE 2.5; .5 MG/3ML; MG/3ML
1 SOLUTION RESPIRATORY (INHALATION) ONCE
Status: COMPLETED | OUTPATIENT
Start: 2025-07-12 | End: 2025-07-12

## 2025-07-12 RX ORDER — POTASSIUM CHLORIDE 1500 MG/1
40 TABLET, EXTENDED RELEASE ORAL ONCE
Status: COMPLETED | OUTPATIENT
Start: 2025-07-12 | End: 2025-07-12

## 2025-07-12 RX ADMIN — METHYLPREDNISOLONE SODIUM SUCCINATE 125 MG: 125 INJECTION, POWDER, LYOPHILIZED, FOR SOLUTION INTRAMUSCULAR; INTRAVENOUS at 02:26

## 2025-07-12 RX ADMIN — POTASSIUM CHLORIDE 40 MEQ: 1500 TABLET, EXTENDED RELEASE ORAL at 03:16

## 2025-07-12 RX ADMIN — IPRATROPIUM BROMIDE AND ALBUTEROL SULFATE 1 DOSE: .5; 3 SOLUTION RESPIRATORY (INHALATION) at 02:24

## 2025-07-12 RX ADMIN — ONDANSETRON 4 MG: 2 INJECTION, SOLUTION INTRAMUSCULAR; INTRAVENOUS at 03:48

## 2025-07-12 RX ADMIN — POTASSIUM CHLORIDE 10 MEQ: 7.46 INJECTION, SOLUTION INTRAVENOUS at 03:19

## 2025-07-12 ASSESSMENT — ENCOUNTER SYMPTOMS
WHEEZING: 1
CONSTIPATION: 0
EYE REDNESS: 0
RHINORRHEA: 0
ABDOMINAL PAIN: 0
SHORTNESS OF BREATH: 1
VOMITING: 0
NAUSEA: 0
DIARRHEA: 0
COUGH: 1
BACK PAIN: 0
EYE PAIN: 0

## 2025-07-12 ASSESSMENT — PAIN SCALES - GENERAL
PAINLEVEL_OUTOF10: 0
PAINLEVEL_OUTOF10: 4

## 2025-07-12 ASSESSMENT — PAIN - FUNCTIONAL ASSESSMENT
PAIN_FUNCTIONAL_ASSESSMENT: 0-10
PAIN_FUNCTIONAL_ASSESSMENT: NONE - DENIES PAIN

## 2025-07-12 ASSESSMENT — LIFESTYLE VARIABLES
HOW MANY STANDARD DRINKS CONTAINING ALCOHOL DO YOU HAVE ON A TYPICAL DAY: PATIENT DOES NOT DRINK
HOW OFTEN DO YOU HAVE A DRINK CONTAINING ALCOHOL: NEVER

## 2025-07-12 ASSESSMENT — PAIN DESCRIPTION - LOCATION: LOCATION: CHEST;THROAT

## 2025-07-12 NOTE — ED TRIAGE NOTES
Pt has a hx of asthma and uses an inhaler. P states that he has been coughing and wheezing for the last few days. Pt also sees a pulmonologist for blastomycosis

## 2025-07-12 NOTE — ED PROVIDER NOTES
[facility]  EMERGENCY DEPARTMENT ENCOUNTER        Pt Name: Blaise Timmons  MRN: [id number]  Birthdate 2000  Date of evaluation: 7/12/2025  Provider: Migue Danielson DO  PCP: Hilda Sosa APRN - CNP  Note Started: 1:50 AM EDT 7/12/25    CHIEF COMPLAINT      SOB, Cough    HISTORY OF PRESENT ILLNESS: 1 or more Elements     Chief Complaint   Patient presents with    Cold Symptoms     History from : Patient  Limitations to history : None    Blaise Timmons is a 25 y.o. male who presents to the emergency department secondary to concern for shortness of breath and cough. Reports history of asthma and has been having worsening coughing and wheezing for the last few days or so. He does have a history of blastomycosis and has a pulmonologist that he sees. He did try some nebulizer treatments at home which did improve a little bit, however he still having persistent symptoms.    Past medical history noted below. Aside from what is stated above denies any other symptoms or modifying factors.   reports that he has never smoked. He has never used smokeless tobacco.  Nursing Notes were all reviewed and agreed with or any disagreements addressed in HPI/MDM.  REVIEW OF SYSTEMS :    Review of Systems   Constitutional:  Negative for chills and fever.   HENT:  Negative for congestion and rhinorrhea.    Eyes:  Negative for pain and redness.   Respiratory:  Positive for cough, shortness of breath and wheezing.    Cardiovascular:  Negative for chest pain and leg swelling.   Gastrointestinal:  Negative for abdominal pain, constipation, diarrhea, nausea and vomiting.   Genitourinary:  Negative for frequency and urgency.   Musculoskeletal:  Negative for back pain and neck pain.   Skin:  Negative for rash.   Neurological:  Negative for facial asymmetry and weakness.   Psychiatric/Behavioral:  Negative for agitation and confusion.    All other systems reviewed and are negative.   Pertinent positive

## 2025-07-17 ENCOUNTER — OFFICE VISIT (OUTPATIENT)
Dept: PULMONOLOGY | Facility: CLINIC | Age: 25
End: 2025-07-17
Payer: COMMERCIAL

## 2025-07-17 VITALS
HEIGHT: 69 IN | HEART RATE: 110 BPM | TEMPERATURE: 97.6 F | BODY MASS INDEX: 33.47 KG/M2 | OXYGEN SATURATION: 98 % | DIASTOLIC BLOOD PRESSURE: 74 MMHG | SYSTOLIC BLOOD PRESSURE: 124 MMHG | WEIGHT: 226 LBS

## 2025-07-17 DIAGNOSIS — J30.9 ALLERGIC RHINITIS, UNSPECIFIED SEASONALITY, UNSPECIFIED TRIGGER: Primary | ICD-10-CM

## 2025-07-17 DIAGNOSIS — J45.40 MODERATE PERSISTENT ASTHMA WITHOUT COMPLICATION: ICD-10-CM

## 2025-07-17 RX ORDER — FLUTICASONE FUROATE AND VILANTEROL 100; 25 UG/1; UG/1
1 POWDER RESPIRATORY (INHALATION) DAILY
Qty: 60 EACH | Refills: 3 | Status: SHIPPED | OUTPATIENT
Start: 2025-07-17

## 2025-07-17 RX ORDER — ALBUTEROL SULFATE 0.83 MG/ML
2.5 SOLUTION RESPIRATORY (INHALATION) 4 TIMES DAILY PRN
Qty: 360 ML | Refills: 3 | Status: SHIPPED | OUTPATIENT
Start: 2025-07-17

## 2025-07-17 RX ORDER — ALBUTEROL SULFATE 90 UG/1
2 POWDER, METERED RESPIRATORY (INHALATION) EVERY 4 HOURS PRN
Qty: 1 EACH | Refills: 11 | Status: SHIPPED | OUTPATIENT
Start: 2025-07-17

## 2025-07-17 RX ORDER — PREDNISONE 10 MG/1
TABLET ORAL
Qty: 31 TABLET | Refills: 0 | Status: SHIPPED | OUTPATIENT
Start: 2025-07-17
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
